# Patient Record
Sex: MALE | Race: WHITE | NOT HISPANIC OR LATINO | Employment: OTHER | URBAN - METROPOLITAN AREA
[De-identification: names, ages, dates, MRNs, and addresses within clinical notes are randomized per-mention and may not be internally consistent; named-entity substitution may affect disease eponyms.]

---

## 2023-11-29 ENCOUNTER — HOSPITAL ENCOUNTER (OUTPATIENT)
Dept: RADIATION ONCOLOGY | Facility: HOSPITAL | Age: 58
Setting detail: RADIATION/ONCOLOGY SERIES
Discharge: HOME | End: 2023-11-29
Payer: OTHER GOVERNMENT

## 2023-11-29 ENCOUNTER — OFFICE VISIT (OUTPATIENT)
Dept: RADIATION ONCOLOGY | Facility: HOSPITAL | Age: 58
End: 2023-11-29
Payer: OTHER GOVERNMENT

## 2023-11-29 VITALS
WEIGHT: 200.7 LBS | HEART RATE: 59 BPM | DIASTOLIC BLOOD PRESSURE: 96 MMHG | BODY MASS INDEX: 31.5 KG/M2 | SYSTOLIC BLOOD PRESSURE: 158 MMHG | TEMPERATURE: 96.6 F | RESPIRATION RATE: 18 BRPM | OXYGEN SATURATION: 97 % | HEIGHT: 67 IN

## 2023-11-29 DIAGNOSIS — C61 PROSTATE CANCER: Primary | ICD-10-CM

## 2023-11-29 PROCEDURE — G0463 HOSPITAL OUTPT CLINIC VISIT: HCPCS

## 2023-11-29 RX ORDER — ROSUVASTATIN CALCIUM 5 MG/1
1 TABLET, COATED ORAL DAILY
COMMUNITY
Start: 2023-11-14

## 2023-11-29 RX ORDER — OMEPRAZOLE 20 MG/1
1 CAPSULE, DELAYED RELEASE ORAL DAILY
COMMUNITY
Start: 2023-10-26

## 2023-11-29 RX ORDER — OMEGA-3 FATTY ACIDS/FISH OIL 300-1000MG
CAPSULE ORAL AS NEEDED
COMMUNITY

## 2023-11-29 RX ORDER — AMLODIPINE BESYLATE 5 MG/1
TABLET ORAL
COMMUNITY
Start: 2023-10-09

## 2023-11-29 RX ORDER — MULTIPLE VITAMINS W/ MINERALS TAB 9MG-400MCG
1 TAB ORAL DAILY
COMMUNITY

## 2023-11-29 RX ORDER — ALLOPURINOL 100 MG/1
1 TABLET ORAL DAILY
COMMUNITY
Start: 2023-10-27

## 2023-11-29 RX ORDER — METOPROLOL SUCCINATE 50 MG/1
TABLET, EXTENDED RELEASE ORAL
COMMUNITY
Start: 2023-11-14

## 2023-11-29 RX ORDER — CETIRIZINE HYDROCHLORIDE 10 MG/1
TABLET ORAL
COMMUNITY
Start: 2023-11-22

## 2023-11-29 RX ORDER — LISINOPRIL 40 MG/1
1 TABLET ORAL DAILY
COMMUNITY
Start: 2023-10-26

## 2023-11-29 NOTE — PROGRESS NOTES
CONSULTATION NOTE      :                                                          1965  DATE OF CONSULTATION:                       2023   REQUESTING PHYSICIAN:                   Parish Sawyer MD  REASON FOR CONSULTATION:           Prostate cancer  - Stage I (cT1c, cN0, cM0, PSA: 5.1, Grade Group: 1)           BRIEF HISTORY:  The patient is a very pleasant 58 y.o. male  with very low risk prostate cancer on active surveillance.  He was initially found to have an elevated PSA value of 5.98 ng/ml on 2/10/2022.  4K score was 15% indicating low risk.  MRI reported a region of interest.  Biopsy 3/21/2022 showed no evidence of prostatic adenocarcinoma in the region of interest.    1 out of 6 random cores from the right lobe contained prostatic adenocarcinoma, [place]'s 3+3=6, involving 10% submitted tissue.  2 out of 6 random cores from the left lobe contained prostatic adenocarcinoma, Nathaniel's 3+3 = 6, 10% and 20% tissue.  Patient chose active surveillance.  Follow-up PSA values were 5.1 ng/ml approximately May 2022 and 5.5 ng/ml approximately 2022.  He underwent repeat biopsy 2023.  This showed persistent presence of low-grade prostatic adenocarcinoma, [place]'s 3+3 = 6, involving less than 2% of tissue from the left lateral apex and involving 10% of tissue from the left apex.  Patient is not sure he has had a PSA drawn this year.  He is getting tired of active surveillance and not sure he wants to go through any more biopsies.  He is considering switching to definitive treatment with nonsurgical approach using stereotactic body radiotherapy.  He is seeking additional information regarding that modality.    Allergy: No Known Allergies    Social History:   Social History     Socioeconomic History    Marital status:    Tobacco Use    Smoking status: Every Day     Types: Cigars    Tobacco comments:     Quit cigarettes 3 years ago- cigars ago   Substance and Sexual Activity    Alcohol use:  Yes     Comment: occ    Drug use: Never    Sexual activity: Defer       Past Medical History:   Past Medical History:   Diagnosis Date    Hyperlipemia     Hypertension     Prostate cancer 11/29/2023       Family History: family history includes Cancer in his father and paternal grandmother; Diabetes in his mother; Stroke in his mother.     Surgical History:   Past Surgical History:   Procedure Laterality Date    APPENDECTOMY      COLONOSCOPY      2017    KNEE SURGERY Left     PROSTATE BIOPSY      x 2        Review of Systems:   Review of Systems   All other systems reviewed and are negative.      IPSS Questionnaire (AUA-7):  Over the past month…    1)  Incomplete Emptying  How often have you had a sensation of not emptying your bladder?  1 - Less than 1 time in 5   2)  Frequency  How often have you had to urinate less than every two hours? 1 - Less than 1 time in 5   3)  Intermittency  How often have you found you stopped and started again several times when you urinated?  0 - Not at all   4) Urgency  How often have you found it difficult to postpone urination?  2 - Less than half the time   5) Weak Stream  How often have you had a weak urinary stream?  2 - Less than half the time   6) Straining  How often have you had to push or strain to begin urination?  0 - Not at all   7) Nocturia  How many times did you typically get up at night to urinate?  1 - 1 time   Total Score:  7       Quality of life due to urinary symptoms:  If you were to spend the rest of your life with your urinary condition the way it is now, how would you feel about that? 2-Mostly Satisfied   Urine Leakage (Incontinence) 0-No Leakage     Sexual Health Inventory  Current Status    1)  How do you rate your confidence that you could achieve and keep an erection? 5-Very High   2) When you had erections with sexual stimulation, how often were your erections hard enough for penetration (entering your partner)? 5-Almost always or always   3)  During  "sexual intercourse, how often were you able to maintain your erection after you had penetrated (entered) into your partner? 5-Almost always or always   4) During sexual intercourse, how difficult was it to maintain your erection to completion of intercourse? 5-Not difficult   5) When you attempted sexual intercourse, how often was it satisfactory to you? 5-Almost always or always   Total Score: 25       Bowel Health Inventory  Current Status: 0-No problems, no rectal bleeding, no discharge, less then 5 bowel movements a day        Objective   VITAL SIGNS:   Vitals:    11/29/23 1338   BP: 158/96   Pulse: 59   Resp: 18   Temp: 96.6 °F (35.9 °C)   TempSrc: Temporal   SpO2: 97%   Weight: 91 kg (200 lb 11.2 oz)   Height: 170.2 cm (67\")   PainSc: 0-No pain        Karnofsky score: 100       Physical Exam:   Physical Exam  Vitals and nursing note reviewed.   Constitutional:       Appearance: He is well-developed.   HENT:      Head: Normocephalic and atraumatic.   Cardiovascular:      Rate and Rhythm: Normal rate and regular rhythm.      Heart sounds: Normal heart sounds. No murmur heard.  Pulmonary:      Effort: Pulmonary effort is normal.      Breath sounds: Normal breath sounds. No wheezing or rales.   Abdominal:      General: Bowel sounds are normal. There is no distension.      Palpations: Abdomen is soft.      Tenderness: There is no abdominal tenderness.   Musculoskeletal:         General: No tenderness. Normal range of motion.      Cervical back: Normal range of motion and neck supple.   Lymphadenopathy:      Cervical: No cervical adenopathy.      Upper Body:      Right upper body: No supraclavicular adenopathy.      Left upper body: No supraclavicular adenopathy.   Skin:     General: Skin is warm and dry.   Neurological:      Mental Status: He is alert and oriented to person, place, and time.      Sensory: No sensory deficit.   Psychiatric:         Behavior: Behavior normal.         Thought Content: Thought content " normal.         Judgment: Judgment normal.              The following portions of the patient's history were reviewed and updated as appropriate: allergies, current medications, past family history, past medical history, past social history, past surgical history, and problem list.    Assessment:   Assessment      Prostate cancer, [place]'s 3+3=6, clinical stage I (T1c, M0, M0), maximum PSA 5.98 ng/ml at diagnosis 3/21/2022.  He has very low risk disease and has been on active surveillance with repeat biopsy 4/17/2023 confirming low-grade and low-volume prostate cancer.  He is considering switching to definitive treatment.  We reviewed the radiotherapy treatment options of stereotactic body radiotherapy, intensity modulated radiotherapy and brachytherapy.  He is most interested in stereotactic body radiotherapy if he decides to undergo definitive treatment.  We reviewed the CyberKnife treatment procedure in detail today with patient and wife present.  All questions were answered.    RECOMMENDATIONS: He appears to be due for PSA testing and plans to have that next week.  He will call us with results.  If the PSA is stable, he may consider continuing active surveillance or he may decide to pursue treatment early next year anyway since he would like to avoid any additional prostate biopsies.  If the PSA is rising, he will more likely pursue definitive treatment with CyberKnife SBRT.    Smoking cessation was strongly encouraged, regardless of his decision to treat the prostate cancer.    I spent a total of 45 minutes on todays visit, with more than 30 minutes in direct face to face communication, and the remainder of the time spent in reviewing the relevant history, records, available imaging, and for documentation.    Follow Up:   Return in about 1 week (around 12/6/2023) for Patient will call after he has PSA result.  Diagnoses and all orders for this visit:    1. Prostate cancer (Primary)    Other orders  -      [facility]         Alistair Dumont MD

## 2023-12-05 ENCOUNTER — DOCUMENTATION (OUTPATIENT)
Dept: RADIATION ONCOLOGY | Facility: HOSPITAL | Age: 58
End: 2023-12-05
Payer: OTHER GOVERNMENT

## 2023-12-05 NOTE — PROGRESS NOTES
RADIATION ONCOLOGY PROGRESS NOTE  12/05/23    Pt sent email stating PSA was 6.92 on 11/30/2023 and he will continue active surveillance and recheck his PSA in 6 months.   Dr. Dumont informed of pt's decision.

## 2024-02-13 DIAGNOSIS — C61 PROSTATE CANCER: Primary | ICD-10-CM

## 2024-02-15 DIAGNOSIS — C61 PROSTATE CANCER: Primary | ICD-10-CM

## 2024-03-07 ENCOUNTER — HOSPITAL ENCOUNTER (OUTPATIENT)
Dept: RADIATION ONCOLOGY | Facility: HOSPITAL | Age: 59
Setting detail: RADIATION/ONCOLOGY SERIES
Discharge: HOME | End: 2024-03-07
Payer: OTHER GOVERNMENT

## 2024-03-07 ENCOUNTER — OFFICE VISIT (OUTPATIENT)
Dept: RADIATION ONCOLOGY | Facility: HOSPITAL | Age: 59
End: 2024-03-07
Payer: OTHER GOVERNMENT

## 2024-03-07 VITALS
RESPIRATION RATE: 20 BRPM | OXYGEN SATURATION: 97 % | BODY MASS INDEX: 31.36 KG/M2 | HEART RATE: 59 BPM | WEIGHT: 200.2 LBS | TEMPERATURE: 97.8 F

## 2024-03-07 DIAGNOSIS — C61 PROSTATE CANCER: Primary | ICD-10-CM

## 2024-03-07 PROCEDURE — G0463 HOSPITAL OUTPT CLINIC VISIT: HCPCS

## 2024-03-07 NOTE — PROGRESS NOTES
RE-EVALUATION    PATIENT:                                                      Wai Calixto  :                                                          1965  DATE:                          3/7/2024   DIAGNOSIS:     Prostate cancer  - Stage I (cT1c, cN0, cM0, PSA: 5.1, Grade Group: 1)       BRIEF HISTORY:  The patient is a very pleasant 59 y.o. male  with prostate cancer, [place]'s 3+3=6, clinical stage I (T1c, M0, M0), maximum PSA 5.98 ng/ml at diagnosis 3/21/2022.  He has very low risk disease.  He has been on active surveillance.  Repeat biopsy 2023 confirmed low-grade and low-volume prostate cancer.  PSA; however, has been slowly rising up to recent maximum value of 8.56 ng/mL on 2024.  He has decided to pursue definitive treatment rather than undergo any additional biopsies or continue active surveillance.    He has been reducing cigarette smoking and plans on quitting completely around the time of treatment for his prostate cancer.  He has had no other change in health since he was last seen for consultation.    No Known Allergies    Review of Systems   All other systems reviewed and are negative.        IPSS Questionnaire (AUA-7):  Over the past month…     1)  Incomplete Emptying  How often have you had a sensation of not emptying your bladder?  1 - Less than 1 time in 5   2)  Frequency  How often have you had to urinate less than every two hours? 1 - Less than 1 time in 5   3)  Intermittency  How often have you found you stopped and started again several times when you urinated?  0 - Not at all   4) Urgency  How often have you found it difficult to postpone urination?  2 - Less than half the time   5) Weak Stream  How often have you had a weak urinary stream?  2 - Less than half the time   6) Straining  How often have you had to push or strain to begin urination?  0 - Not at all   7) Nocturia  How many times did you typically get up at night to urinate?  1 - 1 time   Total Score:  7          Quality of life due to urinary symptoms:  If you were to spend the rest of your life with your urinary condition the way it is now, how would you feel about that? 2-Mostly Satisfied   Urine Leakage (Incontinence) 0-No Leakage      Sexual Health Inventory  Current Status     1)  How do you rate your confidence that you could achieve and keep an erection? 5-Very High   2) When you had erections with sexual stimulation, how often were your erections hard enough for penetration (entering your partner)? 5-Almost always or always   3)  During sexual intercourse, how often were you able to maintain your erection after you had penetrated (entered) into your partner? 5-Almost always or always   4) During sexual intercourse, how difficult was it to maintain your erection to completion of intercourse? 5-Not difficult   5) When you attempted sexual intercourse, how often was it satisfactory to you? 5-Almost always or always   Total Score: 25         Bowel Health Inventory  Current Status: 0-No problems, no rectal bleeding, no discharge, less then 5 bowel movements a day               Objective   VITAL SIGNS:   Vitals:    03/07/24 1438   Pulse: 59   Resp: 20   Temp: 97.8 °F (36.6 °C)   TempSrc: Skin   SpO2: 97%   Weight: 90.8 kg (200 lb 3.2 oz)   PainSc: 0-No pain        Karnofsky score: 90       Physical Exam  Vitals and nursing note reviewed.   Constitutional:       Appearance: He is well-developed.   HENT:      Head: Normocephalic and atraumatic.   Cardiovascular:      Rate and Rhythm: Normal rate and regular rhythm.      Heart sounds: Normal heart sounds. No murmur heard.  Pulmonary:      Effort: Pulmonary effort is normal.      Breath sounds: Normal breath sounds. No wheezing or rales.   Abdominal:      General: Bowel sounds are normal. There is no distension.      Palpations: Abdomen is soft.      Tenderness: There is no abdominal tenderness.   Genitourinary:     Prostate: Enlarged (40-50 cc). Not tender and no nodules  present.      Rectum: No mass, tenderness, anal fissure, external hemorrhoid or internal hemorrhoid. Normal anal tone.   Musculoskeletal:         General: No tenderness. Normal range of motion.      Cervical back: Normal range of motion and neck supple.   Lymphadenopathy:      Cervical: No cervical adenopathy.      Upper Body:      Right upper body: No supraclavicular adenopathy.      Left upper body: No supraclavicular adenopathy.   Skin:     General: Skin is warm and dry.   Neurological:      Mental Status: He is alert and oriented to person, place, and time.      Sensory: No sensory deficit.   Psychiatric:         Behavior: Behavior normal.         Thought Content: Thought content normal.         Judgment: Judgment normal.              The following portions of the patient's history were reviewed and updated as appropriate: allergies, current medications, past family history, past medical history, past social history, past surgical history, and problem list.    Diagnoses and all orders for this visit:    Prostate cancer      IMPRESSION:  Prostate cancer, [place]'s 3+3=6, clinical stage I (T1c, M0, M0), maximum PSA 5.98 ng/ml at diagnosis 3/21/2022.  He has been on active surveillance.  PSA has now increased to 8.56 ng/ml.  He is ready to begin definitive treatment.  He has chosen stereotactic body radiotherapy.  We again reviewed the CyberKnife treatment procedure.  All questions were answered.  Informed consent was obtained today.    RECOMMENDATIONS: He is planned for placement of gold seed fiducials by Dr. Sawyer on 3/12/2024.  He will return here for treatment planning CT and MRI pelvis on 3/19/2024.  The prostate gland will receive 5 daily fractions of 7 Brand, delivered on CyberKnife.  Complete smoking cessation was again strongly encouraged.         Alistair Dumont MD      Approximate 35 minutes was spent reviewing records, with patient and wife and for documentation.

## 2024-03-11 ENCOUNTER — DOCUMENTATION (OUTPATIENT)
Dept: RADIATION ONCOLOGY | Facility: HOSPITAL | Age: 59
End: 2024-03-11
Payer: OTHER GOVERNMENT

## 2024-03-14 ENCOUNTER — DOCUMENTATION (OUTPATIENT)
Dept: NUTRITION | Facility: HOSPITAL | Age: 59
End: 2024-03-14
Payer: OTHER GOVERNMENT

## 2024-03-14 NOTE — PROGRESS NOTES
[facility] Nutrition    Attempted to reach patient via phone to review the Gas Elimination Diet and instructions in preparation for the imaging scans and CK treatment for prostate cancer.  No answer; VM left requesting return phone call.    1:20 pm  Phone consult with patient regarding the Gas Elimination Diet and instructions in preparation for the imaging scans and CK treatment for prostate cancer.  Reviewed the rationale for the diet modification, gas forming foods, schedule for following, Gas X, enemas, NPO status x 6 hours prior to MRI and appointment times.  Patient verbalized excellent comprehension of diet; all questions were addressed.

## 2024-03-19 ENCOUNTER — HOSPITAL ENCOUNTER (OUTPATIENT)
Dept: RADIATION ONCOLOGY | Facility: HOSPITAL | Age: 59
Discharge: HOME | End: 2024-03-19

## 2024-03-19 ENCOUNTER — HOSPITAL ENCOUNTER (OUTPATIENT)
Dept: MRI IMAGING | Facility: HOSPITAL | Age: 59
Discharge: HOME | End: 2024-03-19
Admitting: RADIOLOGY
Payer: OTHER GOVERNMENT

## 2024-03-19 ENCOUNTER — OFFICE VISIT (OUTPATIENT)
Dept: RADIATION ONCOLOGY | Facility: HOSPITAL | Age: 59
End: 2024-03-19
Payer: OTHER GOVERNMENT

## 2024-03-19 VITALS
SYSTOLIC BLOOD PRESSURE: 149 MMHG | DIASTOLIC BLOOD PRESSURE: 87 MMHG | TEMPERATURE: 98.3 F | RESPIRATION RATE: 20 BRPM | HEART RATE: 62 BPM | BODY MASS INDEX: 30.62 KG/M2 | OXYGEN SATURATION: 97 % | WEIGHT: 195.5 LBS

## 2024-03-19 DIAGNOSIS — C61 PROSTATE CANCER: Primary | ICD-10-CM

## 2024-03-19 DIAGNOSIS — C61 PROSTATE CANCER: ICD-10-CM

## 2024-03-19 PROCEDURE — G0463 HOSPITAL OUTPT CLINIC VISIT: HCPCS

## 2024-03-19 PROCEDURE — 72195 MRI PELVIS W/O DYE: CPT

## 2024-03-19 PROCEDURE — 77399 UNLISTED PX MED RADJ PHYSICS: CPT | Performed by: RADIOLOGY

## 2024-03-19 NOTE — PROGRESS NOTES
RE-EVALUATION    PATIENT:                                                      Wai Calixto  :                                                          1965  DATE:                          3/19/2024   DIAGNOSIS:     Prostate cancer  - Stage I (cT1c, cN0, cM0, PSA: 5.1, Grade Group: 1)         BRIEF HISTORY:  The patient is a very pleasant 59 y.o. male  with low risk prostate cancer and disease progression with rising PSA on active surveillance.  He chose to undergo definitive treatment at this time using stereotactic body radiotherapy.  He underwent placement of gold seed fiducials performed by Dr. Sawyer without difficulty.  He has had some increased in slow stream and nocturia but no fevers or dysuria.  No hematuria.  He has chronic low back pain.  He has had no other change in health since informed consent was obtained on 3/7/2024 and he remains a candidate for SBRT.  He is here today for simulation in preparation for treatment.    No Known Allergies    Review of Systems   All other systems reviewed and are negative.        IPSS Questionnaire (AUA-7):  Over the past month…     1)  Incomplete Emptying  How often have you had a sensation of not emptying your bladder?  1 - Less than 1 time in 5   2)  Frequency  How often have you had to urinate less than every two hours? 1 - Less than 1 time in 5   3)  Intermittency  How often have you found you stopped and started again several times when you urinated?  0 - Not at all   4) Urgency  How often have you found it difficult to postpone urination?  2 - Less than half the time   5) Weak Stream  How often have you had a weak urinary stream?  2 - Less than half the time   6) Straining  How often have you had to push or strain to begin urination?  0 - Not at all   7) Nocturia  How many times did you typically get up at night to urinate?  1 - 1 time   Total Score:  7         Quality of life due to urinary symptoms:  If you were to spend the rest of your life with your  urinary condition the way it is now, how would you feel about that? 2-Mostly Satisfied   Urine Leakage (Incontinence) 0-No Leakage      Sexual Health Inventory  Current Status     1)  How do you rate your confidence that you could achieve and keep an erection? 5-Very High   2) When you had erections with sexual stimulation, how often were your erections hard enough for penetration (entering your partner)? 5-Almost always or always   3)  During sexual intercourse, how often were you able to maintain your erection after you had penetrated (entered) into your partner? 5-Almost always or always   4) During sexual intercourse, how difficult was it to maintain your erection to completion of intercourse? 5-Not difficult   5) When you attempted sexual intercourse, how often was it satisfactory to you? 5-Almost always or always   Total Score: 25         Bowel Health Inventory  Current Status: 0-No problems, no rectal bleeding, no discharge, less then 5 bowel movements a day             Objective   VITAL SIGNS:   Vitals:    03/19/24 1036   BP: 149/87   Pulse: 62   Resp: 20   Temp: 98.3 °F (36.8 °C)   TempSrc: Skin   SpO2: 97%   Weight: 88.7 kg (195 lb 8 oz)   PainSc: 0-No pain            KSP %:  90    Physical Exam         The following portions of the patient's history were reviewed and updated as appropriate: allergies, current medications, past family history, past medical history, past social history, past surgical history, and problem list.    Diagnoses and all orders for this visit:    Prostate cancer      IMPRESSION:  Prostate cancer, [place]'s 3+3=6, clinical stage I (T1c, M0, M0), PSA 5.98 ng/ml at diagnosis 3/21/2022, increasing to recent maximum value to 8.56 ng/ml on active surveillance.    RECOMMENDATIONS: Treatment planning CT and MRI pelvis will be performed today.  Prostate gland will receive 5 daily fractions of 7 Brand, delivered on the CyberKnife.         Alistair Dumont MD    Approximately 15 minutes was  spent reviewing records and procedure with patient and for documentation.

## 2024-04-01 ENCOUNTER — HOSPITAL ENCOUNTER (OUTPATIENT)
Dept: RADIATION ONCOLOGY | Facility: HOSPITAL | Age: 59
Setting detail: RADIATION/ONCOLOGY SERIES
End: 2024-04-01
Payer: OTHER GOVERNMENT

## 2024-04-03 PROCEDURE — 77300 RADIATION THERAPY DOSE PLAN: CPT | Performed by: RADIOLOGY

## 2024-04-03 PROCEDURE — 77301 RADIOTHERAPY DOSE PLAN IMRT: CPT | Performed by: RADIOLOGY

## 2024-04-03 PROCEDURE — 77338 DESIGN MLC DEVICE FOR IMRT: CPT | Performed by: RADIOLOGY

## 2024-04-03 RX ORDER — TAMSULOSIN HYDROCHLORIDE 0.4 MG/1
1 CAPSULE ORAL NIGHTLY
Qty: 30 CAPSULE | Refills: 11 | Status: SHIPPED | OUTPATIENT
Start: 2024-04-03

## 2024-04-08 ENCOUNTER — HOSPITAL ENCOUNTER (OUTPATIENT)
Dept: RADIATION ONCOLOGY | Facility: HOSPITAL | Age: 59
Discharge: HOME | End: 2024-04-08
Payer: OTHER GOVERNMENT

## 2024-04-08 PROCEDURE — 77373 STRTCTC BDY RAD THER TX DLVR: CPT | Performed by: RADIOLOGY

## 2024-04-09 ENCOUNTER — HOSPITAL ENCOUNTER (OUTPATIENT)
Dept: RADIATION ONCOLOGY | Facility: HOSPITAL | Age: 59
Discharge: HOME | End: 2024-04-09

## 2024-04-09 PROCEDURE — 77373 STRTCTC BDY RAD THER TX DLVR: CPT | Performed by: RADIOLOGY

## 2024-04-10 ENCOUNTER — HOSPITAL ENCOUNTER (OUTPATIENT)
Dept: RADIATION ONCOLOGY | Facility: HOSPITAL | Age: 59
Discharge: HOME | End: 2024-04-10

## 2024-04-10 PROCEDURE — 77373 STRTCTC BDY RAD THER TX DLVR: CPT | Performed by: RADIOLOGY

## 2024-04-11 ENCOUNTER — HOSPITAL ENCOUNTER (OUTPATIENT)
Dept: RADIATION ONCOLOGY | Facility: HOSPITAL | Age: 59
Discharge: HOME | End: 2024-04-11

## 2024-04-11 PROCEDURE — 77373 STRTCTC BDY RAD THER TX DLVR: CPT | Performed by: RADIOLOGY

## 2024-04-12 ENCOUNTER — HOSPITAL ENCOUNTER (OUTPATIENT)
Dept: RADIATION ONCOLOGY | Facility: HOSPITAL | Age: 59
Discharge: HOME | End: 2024-04-12

## 2024-04-12 PROCEDURE — 77373 STRTCTC BDY RAD THER TX DLVR: CPT | Performed by: RADIOLOGY

## 2024-04-12 PROCEDURE — 77336 RADIATION PHYSICS CONSULT: CPT | Performed by: RADIOLOGY

## 2024-04-17 ENCOUNTER — TELEPHONE (OUTPATIENT)
Dept: RADIATION ONCOLOGY | Facility: HOSPITAL | Age: 59
End: 2024-04-17
Payer: OTHER GOVERNMENT

## 2024-04-17 NOTE — TELEPHONE ENCOUNTER
Pt's wife called stating pt is having minimal burning that is well controlled with AZO but he is having diarrhea stools 5-6 times per day. She asked if he could take pepto bismol.  I instructed her to have him take imodium until the stool starts to form up then stop to avoid constipation.  Wife verbalized understanding.

## 2024-05-10 ENCOUNTER — OFFICE VISIT (OUTPATIENT)
Dept: NEUROSURGERY | Facility: CLINIC | Age: 59
End: 2024-05-10
Payer: OTHER GOVERNMENT

## 2024-05-10 VITALS — WEIGHT: 203.5 LBS | TEMPERATURE: 98.4 F | BODY MASS INDEX: 31.94 KG/M2 | HEIGHT: 67 IN

## 2024-05-10 DIAGNOSIS — M48.061 SPINAL STENOSIS OF LUMBAR REGION WITHOUT NEUROGENIC CLAUDICATION: ICD-10-CM

## 2024-05-10 DIAGNOSIS — M51.36 DDD (DEGENERATIVE DISC DISEASE), LUMBAR: ICD-10-CM

## 2024-05-10 DIAGNOSIS — M47.819 FACET ARTHROPATHY: ICD-10-CM

## 2024-05-10 DIAGNOSIS — M54.9 MECHANICAL BACK PAIN: Primary | ICD-10-CM

## 2024-05-10 PROCEDURE — 99204 OFFICE O/P NEW MOD 45 MIN: CPT | Performed by: NEUROLOGICAL SURGERY

## 2024-05-10 RX ORDER — MELOXICAM 7.5 MG/1
7.5 TABLET ORAL DAILY
Qty: 30 TABLET | Refills: 2 | Status: SHIPPED | OUTPATIENT
Start: 2024-05-10

## 2024-05-10 RX ORDER — POLYETHYLENE GLYCOL 3350 17 G/17G
POWDER, FOR SOLUTION ORAL
COMMUNITY
Start: 2024-02-07

## 2024-05-13 ENCOUNTER — HOSPITAL ENCOUNTER (OUTPATIENT)
Dept: RADIATION ONCOLOGY | Facility: HOSPITAL | Age: 59
Setting detail: RADIATION/ONCOLOGY SERIES
Discharge: HOME | End: 2024-05-13
Payer: OTHER GOVERNMENT

## 2024-05-13 ENCOUNTER — OFFICE VISIT (OUTPATIENT)
Dept: RADIATION ONCOLOGY | Facility: HOSPITAL | Age: 59
End: 2024-05-13
Payer: OTHER GOVERNMENT

## 2024-05-13 DIAGNOSIS — C61 PROSTATE CANCER: Primary | ICD-10-CM

## 2024-05-13 NOTE — PROGRESS NOTES
TELEMEDICINE FOLLOW UP NOTE    PATIENT:                                                      Wai Calixto  MEDICAL RECORD #:                        [id number]  :                                                          1965  COMPLETION DATE:   2024  DIAGNOSIS:     Prostate cancer  - Stage I (cT1c, cN0, cM0, PSA: 5.1, Grade Group: 1)    This visit has been converted to a telehealth virtual visit, the patient's preferred method for today's follow-up.  Total time of discussion was 26 minutes.  The patient has given verbal consent.    BRIEF HISTORY:    Initial follow-up visit.  He has very low risk prostate cancer, found to have disease progression with rising PSA on active surveillance.  Repeat biopsy 2023 confirmed low-grade and low-volume prostate cancer.  He opted to pursue definitive treatment with a course of CyberKnife stereotactic body radiotherapy.  He tolerated treatment well.  Mild posttreatment fatigue is subsiding.  He did develop brief exacerbation of dysuria, increased urinary urgency, and increased hesitancy/intermittency with stream beyond his baseline mild urinary outflow obstructive symptoms.  This was managed with OTC Azo and daily Flomax with improvement.  No hematuria.  No urinary incontinence.  He experienced an approximate 1.5-week duration of diarrhea and increased frequency of stools, managed with OTC Imodium and now resolved.  He reports bowel function is back to baseline.  He continues to endorse good erectile function.  No new aches or pains.  Overall, he feels well.    IPSS Questionnaire (AUA-7):  Over the past month…    1)  Incomplete Emptying  How often have you had a sensation of not emptying your bladder?  1 - Less than 1 time in 5   2)  Frequency  How often have you had to urinate less than every two hours? 1 - Less than 1 time in 5   3)  Intermittency  How often have you found you stopped and started again several times when you urinated?  3 - About half the time    4) Urgency  How often have you found it difficult to postpone urination?  3 - About half the time   5) Weak Stream  How often have you had a weak urinary stream?  3 - About half the time   6) Straining  How often have you had to push or strain to begin urination?  3 - About half the time   7) Nocturia  How many times did you typically get up at night to urinate?  2 - 2 times   Total Score:  16       Quality of life due to urinary symptoms:  If you were to spend the rest of your life with your urinary condition the way it is now, how would you feel about that? 3-Mixed   Urine Leakage (Incontinence) 0-No Leakage     Sexual Health Inventory  Current Status    1)  How do you rate your confidence that you could achieve and keep an erection? 5-Very High   2) When you had erections with sexual stimulation, how often were your erections hard enough for penetration (entering your partner)? 5-Almost always or always   3)  During sexual intercourse, how often were you able to maintain your erection after you had penetrated (entered) into your partner? 5-Almost always or always   4) During sexual intercourse, how difficult was it to maintain your erection to completion of intercourse? 5-Not difficult   5) When you attempted sexual intercourse, how often was it satisfactory to you? 5-Almost always or always   Total Score: 25       Bowel Health Inventory  Current Status: 0-No problems, no rectal bleeding, no discharge, less then 5 bowel movements a day         MEDICATIONS: Medication reconciliation for the patient was reviewed and confirmed in the electronic medical record.    Review of Systems   Constitutional:  Positive for fatigue.   Genitourinary:  Positive for frequency and nocturia.    All other systems reviewed and are negative.          KPS 90%    Physical Exam  Pulmonary:      Respirations even, unlabored. No audible wheezing or cough.  Neurological:      A+Ox4, conversant, answers questions  appropriately.  Psychiatric:     Judgement, affect, and decision-making WNL.    Limited physical exam as visit was conducted remotely via telephone.          The following portions of the patient's history were reviewed and updated as appropriate: allergies, current medications, past family history, past medical history, past social history, past surgical history and problem list.         Diagnoses and all orders for this visit:    1. Prostate cancer (Primary)         IMPRESSION:  Prostate cancer, [place]'s 3+3=6, clinical stage I (T1c, M0, M0), PSA 5.98 ng/ml at diagnosis 3/21/2022, increasing to recent maximum value to 8.56 ng/ml on active surveillance.   1 month status post CyberKnife stereotactic body radiotherapy as definitive treatment.  He tolerated treatment well.  He developed the anticipated grade 1 fatigue, grade 1 dysuria, and grade 1 loose stool/diarrhea which were managed supportively and have since subsided.  He did not develop significant acute radiation related toxicities otherwise.  Discussed plan to taper/discontinue Flomax, as tolerated.  Smoking cessation was again strongly encouraged.    Mr. Calixto and I have reviewed the survivorship care plan in detail.  We discussed diagnosis, follow-up intervals, biannual PSA monitoring, and expectations for response to treatment, including typical timeline for PSA to hopefully reach target tony value of <0.5 ng/mL.  A copy of the care plan has been mailed to the patient.  A copy has also been sent to his PCP.     RECOMMENDATIONS:   Mr. Calixto continues routine urologic surveillance under the care of Dr. Sawyer, with follow-up and repeat PSA scheduled 7/15/2024.  RTC in 6 months or sooner as needed.          Return in about 6 months (around 11/13/2024) for Office Visit.    DONNY Payne spent a total of 40 minutes on today's visit, with more than 26 minutes in virtual communication with the patient via telephone, and the remainder of the time  spent in reviewing the relevant history, records, available imaging, and for documentation.

## 2024-06-21 ENCOUNTER — OFFICE VISIT (OUTPATIENT)
Dept: NEUROSURGERY | Facility: CLINIC | Age: 59
End: 2024-06-21
Payer: OTHER GOVERNMENT

## 2024-06-21 VITALS
HEIGHT: 67 IN | SYSTOLIC BLOOD PRESSURE: 150 MMHG | WEIGHT: 202.5 LBS | BODY MASS INDEX: 31.78 KG/M2 | DIASTOLIC BLOOD PRESSURE: 90 MMHG

## 2024-06-21 DIAGNOSIS — M48.061 SPINAL STENOSIS, LUMBAR REGION, WITHOUT NEUROGENIC CLAUDICATION: Primary | ICD-10-CM

## 2024-06-21 DIAGNOSIS — M51.36 DDD (DEGENERATIVE DISC DISEASE), LUMBAR: ICD-10-CM

## 2024-06-21 PROBLEM — M51.369 DDD (DEGENERATIVE DISC DISEASE), LUMBAR: Status: ACTIVE | Noted: 2024-06-21

## 2024-06-21 PROCEDURE — 99213 OFFICE O/P EST LOW 20 MIN: CPT | Performed by: PHYSICIAN ASSISTANT

## 2024-06-21 NOTE — PROGRESS NOTES
Subjective     Chief Complaint: low back pain                               Bilateral thigh numbness/tingling L>R    Patient ID: Wai Calixto is a 59 y.o. male is here today for follow-up.    Back Pain  Associated symptoms include numbness. Pertinent negatives include no abdominal pain, chest pain, dysuria, fever, headaches or weakness.       History of Present Illness  Mr Calixto is a 59-year-old  who has had problems with his back since he was in the [employer] in 2006. He reports having had a herniated disc in 2013 but has never had back surgery. He has a home exercise and stretching program that he does regularly. Most of his pain is in his back and this is exacerbated with standing or sitting in one position for more than an hour. Movement and heat help with the pain. In some positions, he also gets numbness in his anterolateral thighs bilaterally, but worse on the left than the right. Again, movement or sitting will relax this. He denies bowel or bladder dysfunction or saddle anesthesia. He does have prostate cancer and has recently started cyberknife treatment for this.   Currently, he is moderately limited by his pain and numbness but is not interested in surgery.   Dr. Manning had seen him in May and had xrays and an MRI of the lumbar spine to review. The MRI only had sagittal images and Dr Manning asked the patient to go to [facility] where the scan had been done to get the complete images. Apparently, this was all that was done and he has only sagittal images today and xrays. These have been loaded into our system.     The following portions of the patient's history were reviewed and updated as appropriate: allergies, current medications, past family history, past medical history, past social history, past surgical history and problem list.    Past Medical History:   Diagnosis Date    Arthritis     Cervical disc disorder     Gout     Hyperlipemia     Hypertension     Low back pain      Lumbosacral disc disease     Peripheral neuropathy     Prostate cancer 2023    Thoracic disc disorder       Past Surgical History:   Procedure Laterality Date    APPENDECTOMY      COLONOSCOPY          CYBERKNIFE  2024    prostate    KNEE SURGERY Left     PROSTATE BIOPSY      x 2      Family history:   Family History   Problem Relation Age of Onset    Stroke Mother     Diabetes Mother     Arthritis Mother     Cancer Father         Stomach cancer later in life.    Heart disease Father         First heart attack at age 34. Open heart surgery in 1982 at 47.    Hyperlipidemia Father     Cancer Paternal Grandmother        Social history:   Social History     Socioeconomic History    Marital status:    Tobacco Use    Smoking status: Former     Current packs/day: 0.00     Types: Cigarettes, Cigars     Start date: 1987     Quit date: 2022     Years since quittin.4    Smokeless tobacco: Never    Tobacco comments:     Quit and start up again over the years. Still smoke cigars sometimes   Vaping Use    Vaping status: Never Used   Substance and Sexual Activity    Alcohol use: Yes     Alcohol/week: 6.0 standard drinks of alcohol     Types: 6 Cans of beer per week     Comment: occ    Drug use: Never    Sexual activity: Yes     Partners: Female       Review of Systems   Constitutional:  Positive for activity change. Negative for appetite change, chills, diaphoresis, fatigue, fever and unexpected weight change.   HENT:  Negative for congestion, dental problem, drooling, ear discharge, ear pain, facial swelling, hearing loss, mouth sores, nosebleeds, postnasal drip, rhinorrhea, sinus pressure, sinus pain, sneezing, sore throat, tinnitus, trouble swallowing and voice change.    Eyes:  Negative for photophobia, pain, discharge, redness, itching and visual disturbance.   Respiratory:  Negative for apnea, cough, choking, chest tightness, shortness of breath, wheezing and stridor.    Cardiovascular:   "Negative for chest pain, palpitations and leg swelling.   Gastrointestinal:  Negative for abdominal distention, abdominal pain, anal bleeding, blood in stool, constipation, diarrhea, nausea, rectal pain and vomiting.   Endocrine: Positive for polyuria. Negative for cold intolerance, heat intolerance, polydipsia and polyphagia.   Genitourinary:  Positive for difficulty urinating and frequency. Negative for decreased urine volume, dysuria, enuresis, flank pain, genital sores, hematuria, penile discharge, penile pain, penile swelling, scrotal swelling, testicular pain and urgency.   Musculoskeletal:  Positive for back pain and neck pain. Negative for arthralgias, gait problem, joint swelling, myalgias and neck stiffness.   Skin:  Negative for color change, pallor, rash and wound.   Allergic/Immunologic: Negative for environmental allergies, food allergies and immunocompromised state.   Neurological:  Positive for numbness. Negative for dizziness, tremors, seizures, syncope, facial asymmetry, speech difficulty, weakness, light-headedness and headaches.   Hematological:  Negative for adenopathy. Does not bruise/bleed easily.   Psychiatric/Behavioral:  Negative for agitation, behavioral problems, confusion, decreased concentration, dysphoric mood, hallucinations, self-injury, sleep disturbance and suicidal ideas. The patient is not nervous/anxious and is not hyperactive.        Objective   Blood pressure 150/90, height 170.2 cm (67\"), weight 91.9 kg (202 lb 8 oz).  Body mass index is 31.72 kg/m².    Physical Exam  CONSTITUTIONAL:   - Patient is well-nourished  - Pleasant and appears stated age.  PSYCHIATRIC:  - Normal mood and affect  - Behavior is normal.  HENT:   Head: Normocephalic and Atraumatic.   Eyes:     - Pupils are equal, round, and reactive to light.     - EOM are normal.   CV:   - Regular rate and rhythm on palpable radial pulse   PULMONARY:   - Speaking in full sentences, breathing non labored  - No wheezing "   SKIN:  - Clean, dry and intact   MUSCULOSKELETAL:  - Neck/Back tenderness to palpation is not observed.   - Strength is intact in the upper and lower extremities to direct testing.  - Muscle tone is normal throughout.  - Station and gait are normal.  - ROM in neck/back is normal.  NEUROLOGICAL:  - A&Ox3  - Attention span, language function, and cognition are intact.  - Sensation is intact to light touch testing throughout.  - Deep tendon reflexes are 1+ and symmetrical.    - Coordination is intact.     Patient's Body mass index is 31.72 kg/m². indicating that he is obese     Independent Review of Radiographic Studies:    Xrays of the lumbar spine show degenerative changes throughout with bone spurs and some facet arthropathy  MRI has only sagittal images. There are disc bulges particularly at L2/3 and L4/5. The latter also has hypertrophy of the posterior elements resulting in some mild to moderate stenosis at this level and also at L2/3. Again, axial images are not available.     Assessment & Plan   Dr Manning had started Mr Calixto on meloxicam, which he will continue. At this point, his pain is frustrating, but is not limiting him from doing his job or recreating as he would like, though he has to take breaks to move and stretch, etc.   The patient is not interested in surgery at this time. We discussed signs and symptoms of cauda equina and also symptoms of increasing lumbar stenosis. Should his pain and numbness become progressive, we would recommend a new MRI. At this point, we will follow up with him on an as-needed basis and will be happy to see him again should his symptoms change or become more concerning.     Diagnoses and all orders for this visit:    1. Spinal stenosis, lumbar region, without neurogenic claudication (Primary)    2. DDD (degenerative disc disease), lumbar    Other orders  -     MRI outside films; Future  -     XR outside films; Future        Return if symptoms worsen or fail to  improve.       Bernadette Merino PA-C

## 2024-11-14 ENCOUNTER — HOSPITAL ENCOUNTER (OUTPATIENT)
Dept: RADIATION ONCOLOGY | Facility: HOSPITAL | Age: 59
Setting detail: RADIATION/ONCOLOGY SERIES
Discharge: HOME | End: 2024-11-14
Payer: OTHER GOVERNMENT

## 2024-11-14 ENCOUNTER — OFFICE VISIT (OUTPATIENT)
Dept: RADIATION ONCOLOGY | Facility: HOSPITAL | Age: 59
End: 2024-11-14
Payer: OTHER GOVERNMENT

## 2024-11-14 VITALS
OXYGEN SATURATION: 98 % | DIASTOLIC BLOOD PRESSURE: 89 MMHG | TEMPERATURE: 97.5 F | RESPIRATION RATE: 16 BRPM | HEART RATE: 67 BPM | WEIGHT: 204.9 LBS | SYSTOLIC BLOOD PRESSURE: 185 MMHG | BODY MASS INDEX: 32.09 KG/M2

## 2024-11-14 DIAGNOSIS — C61 PROSTATE CANCER: Primary | ICD-10-CM

## 2024-11-14 NOTE — PROGRESS NOTES
FOLLOW UP NOTE    PATIENT:                                                      Wai Calixto  MEDICAL RECORD #:                        [id number]  :                                                          1965  COMPLETION DATE:   2024  DIAGNOSIS:     Prostate cancer  - Stage I (cT1c, cN0, cM0, PSA: 5.1, Grade Group: 1)      BRIEF HISTORY:    Routine follow-up visit.  He has very low risk prostate cancer, found to have disease progression with rising PSA on active surveillance.  Repeat biopsy 2023 confirmed low-grade and low-volume prostate cancer.  He opted to pursue definitive treatment with a course of CyberKnife stereotactic body radiotherapy, completing 2024.  He tolerated treatment well.  He did develop expected acute side effects of grade 1 fatigue, grade 1 dysuria, and grade 1 loose stool/diarrhea which were managed supportively and have subsided.  At this point, he reports an IPSS score of 19, slightly above baseline, citing overall improvement in urinary stream and less straining from prior, but slightly worse frequency and urgency.  He has been taking Flomax QOD and notices a difference on the days he takes it versus the days he doesn't.  He denies gross hematuria, dysuria, or urinary incontinence.  He reports bowel function is normal.  He denies acute GI complaints.  He endorses overall high erectile function.  He describes chronic back pains but denies new/focal bony pains or unexplained weight loss.  Initial posttreatment PSA on 7/15/2024 was 3.3 ng/mL, and further decreased to a value of 1.12 ng/mL on 10/14/2024.        IPSS Questionnaire (AUA-7):  Over the past month…    1)  Incomplete Emptying  How often have you had a sensation of not emptying your bladder?  1 - Less than 1 time in 5   2)  Frequency  How often have you had to urinate less than every two hours? 3 - About half the time   3)  Intermittency  How often have you found you stopped and started again several times  when you urinated?  1 - Less than 1 time in 5   4) Urgency  How often have you found it difficult to postpone urination?  5 - Almost always   5) Weak Stream  How often have you had a weak urinary stream?  3 - About half the time   6) Straining  How often have you had to push or strain to begin urination?  1 - Less than 1 time in 5   7) Nocturia  How many times did you typically get up at night to urinate?  5 - 5+ times   Total Score:  19       Quality of life due to urinary symptoms:  If you were to spend the rest of your life with your urinary condition the way it is now, how would you feel about that? 2-Mostly Satisfied   Urine Leakage (Incontinence) 1-Mild (A few drops a day, no pad use)     Sexual Health Inventory  Current Status    1)  How do you rate your confidence that you could achieve and keep an erection? 4-High   2) When you had erections with sexual stimulation, how often were your erections hard enough for penetration (entering your partner)? 5-Almost always or always   3)  During sexual intercourse, how often were you able to maintain your erection after you had penetrated (entered) into your partner? 5-Almost always or always   4) During sexual intercourse, how difficult was it to maintain your erection to completion of intercourse? 4-Slightly difficult   5) When you attempted sexual intercourse, how often was it satisfactory to you? 5-Almost always or always   Total Score: 23       Bowel Health Inventory  Current Status: 0-No problems, no rectal bleeding, no discharge, less then 5 bowel movements a day               MEDICATIONS: Medication reconciliation for the patient was reviewed and confirmed in the electronic medical record.    Review of Systems   Genitourinary:  Positive for frequency and nocturia.    All other systems reviewed and are negative.          KPS 90%      Physical Exam  Vitals and nursing note reviewed.   Constitutional:       General: He is not in acute distress.     Appearance:  Normal appearance. He is well-developed.   HENT:      Head: Normocephalic and atraumatic.   Eyes:      Conjunctiva/sclera: Conjunctivae normal.      Pupils: Pupils are equal, round, and reactive to light.   Cardiovascular:      Rate and Rhythm: Normal rate and regular rhythm.   Pulmonary:      Effort: Pulmonary effort is normal. No respiratory distress.      Breath sounds: Normal breath sounds.   Genitourinary:     Prostate: Not enlarged (~20-25 cc (previously 40-50 cc), smooth, flat, symmetric, no nodules or induration.  SV nonpalpable.), not tender and no nodules present.      Rectum: No external hemorrhoid or internal hemorrhoid. Normal anal tone.   Musculoskeletal:         General: Normal range of motion.   Skin:     General: Skin is warm and dry.   Neurological:      Mental Status: He is alert and oriented to person, place, and time.   Psychiatric:         Behavior: Behavior normal.         Thought Content: Thought content normal.         Judgment: Judgment normal.         VITAL SIGNS:   Vitals:    11/14/24 1030   BP: (!) 185/89  Comment: states normally runs 130s/80s, he keeps a log with home cuff for his PCP to review   Pulse: 67   Resp: 16   Temp: 97.5 °F (36.4 °C)   TempSrc: Temporal   SpO2: 98%   Weight: 92.9 kg (204 lb 14.4 oz)   PainSc: 0-No pain               The following portions of the patient's history were reviewed and updated as appropriate: allergies, current medications, past family history, past medical history, past social history, past surgical history and problem list.         Diagnoses and all orders for this visit:    1. Prostate cancer (Primary)         IMPRESSION:  Prostate cancer, [place]'s 3+3=6, clinical stage I (T1c, M0, M0), PSA 5.98 ng/ml at diagnosis 3/21/2022, increasing to recent maximum value to 8.56 ng/ml on active surveillance.   7 months status post CyberKnife stereotactic body radiotherapy.  He tolerated treatment well.  He was encouraged to trial increase of Flomax to once  daily dosing for management of lower urinary tract symptoms.  He has had a very good clinical and biochemical response to treatment with downsizing of the prostate and appropriate early PSA decline down to a value of 1.2 ng/mL on 10/14/2024.  Prognosis should remain excellent.  We again discussed follow-up intervals, biannual PSA monitoring, and expectations for response to treatment, including typical timeline for PSA to hopefully reach target tony value of <0.5 ng/mL.      RECOMMENDATIONS:  Continues routine urologic surveillance and serial PSA monitoring under the direction of Dr. Sawyer.  RTC in 1 year, or sooner as needed.    Return in about 1 year (around 11/14/2025) for Office Visit.    DONNY Payne spent a total of 35 minutes on today's visit, with more than 20 minutes in direct face to face communication, and the remainder of the time spent in reviewing the relevant history, records, available imaging, and for documentation.

## 2025-07-01 RX ORDER — TAMSULOSIN HYDROCHLORIDE 0.4 MG/1
1 CAPSULE ORAL NIGHTLY
Qty: 30 CAPSULE | Refills: 11 | OUTPATIENT
Start: 2025-07-01